# Patient Record
Sex: FEMALE | Race: WHITE | Employment: UNEMPLOYED | URBAN - METROPOLITAN AREA
[De-identification: names, ages, dates, MRNs, and addresses within clinical notes are randomized per-mention and may not be internally consistent; named-entity substitution may affect disease eponyms.]

---

## 2017-03-15 PROCEDURE — 88305 TISSUE EXAM BY PATHOLOGIST: CPT | Performed by: RADIOLOGY

## 2018-05-22 PROCEDURE — 88305 TISSUE EXAM BY PATHOLOGIST: CPT | Performed by: RADIOLOGY

## 2019-08-02 ENCOUNTER — HOSPITAL ENCOUNTER (EMERGENCY)
Age: 49
Discharge: HOME | End: 2019-08-02
Attending: EMERGENCY MEDICINE
Payer: COMMERCIAL

## 2019-08-02 VITALS
DIASTOLIC BLOOD PRESSURE: 79 MMHG | OXYGEN SATURATION: 100 % | HEIGHT: 59 IN | SYSTOLIC BLOOD PRESSURE: 137 MMHG | TEMPERATURE: 99 F | WEIGHT: 135 LBS | BODY MASS INDEX: 27.21 KG/M2 | HEART RATE: 71 BPM | RESPIRATION RATE: 16 BRPM

## 2019-08-02 DIAGNOSIS — S91.331A PUNCTURE WOUND OF RIGHT FOOT, INITIAL ENCOUNTER: Primary | ICD-10-CM

## 2019-08-02 PROCEDURE — 90471 IMMUNIZATION ADMIN: CPT

## 2019-08-02 PROCEDURE — 99283 EMERGENCY DEPT VISIT LOW MDM: CPT

## 2019-08-02 RX ORDER — CIPROFLOXACIN 500 MG/1
500 TABLET, FILM COATED ORAL 2 TIMES DAILY
Qty: 20 TABLET | Refills: 0 | Status: SHIPPED | OUTPATIENT
Start: 2019-08-02 | End: 2019-08-12

## 2019-08-02 NOTE — ED PROVIDER NOTES
Patient Seen in: [facility] In [place]    History   Patient presents with:  Laceration Abrasion (integumentary)    Stated Complaint: stepped on a nail    HPI  58-year-old woman stepped on a nail.   Her roof was recently done she is wea MDM        Patient will be referred to podiatry for close follow-up. Given Cipro here tetanus updated.   Patient informed that his possible that a tiny portion of her shoe may have penetrated into the plantar surface of the foot although appears unlike

## 2019-10-23 PROBLEM — S83.511D NEW ACL TEAR, RIGHT, SUBSEQUENT ENCOUNTER: Status: ACTIVE | Noted: 2019-10-23

## 2019-11-04 PROBLEM — S83.281D ACUTE LATERAL MENISCAL TEAR, RIGHT, SUBSEQUENT ENCOUNTER: Status: ACTIVE | Noted: 2019-10-23

## 2019-11-04 PROBLEM — M25.461 EFFUSION OF KNEE JOINT RIGHT: Status: ACTIVE | Noted: 2019-11-04

## 2019-12-03 ENCOUNTER — OFFICE VISIT (OUTPATIENT)
Dept: PHYSICAL THERAPY | Age: 49
End: 2019-12-03
Attending: INTERNAL MEDICINE
Payer: COMMERCIAL

## 2019-12-03 DIAGNOSIS — M17.11 PRIMARY OSTEOARTHRITIS OF RIGHT KNEE: ICD-10-CM

## 2019-12-03 DIAGNOSIS — S83.281D ACUTE LATERAL MENISCUS TEAR OF RIGHT KNEE, SUBSEQUENT ENCOUNTER: ICD-10-CM

## 2019-12-03 PROCEDURE — 97161 PT EVAL LOW COMPLEX 20 MIN: CPT

## 2019-12-03 PROCEDURE — 97110 THERAPEUTIC EXERCISES: CPT

## 2019-12-03 NOTE — PROGRESS NOTES
POST-OP KNEE EVALUATION:   Referring Physician: Dr. Sadaf Ortiz  Diagnosis:  R medial menisectomy 11/15/19   Date of Service: 12/3/2019     PATIENT SUMMARY   Brittany Kimble is a 47year old female who presents to therapy today s/p R knee menisectomy on 11/15 Therapy is medically necessary to address the above impairments and reach functional goals. Precautions:  None  OBJECTIVE:   Observation/Skin:  Normal. Pt has band- aids over the port holes today   Palpation:  Tender MCL region and with patellar mobs. structures involved/activity limitations, and evolving symptoms including changing pain levels.   PLAN OF CARE:    Goals: (To be met in 12 visits)   · Pt will improve knee extension ROM to 0 deg to allow proper heel strike during gait and terminal knee exte this plan of treatment and while under my care.     X___________________________________________________ Date____________________    Certification From: 00/2/7185  To:3/2/2020

## 2019-12-06 ENCOUNTER — OFFICE VISIT (OUTPATIENT)
Dept: PHYSICAL THERAPY | Age: 49
End: 2019-12-06
Attending: ORTHOPAEDIC SURGERY
Payer: COMMERCIAL

## 2019-12-06 PROCEDURE — 97110 THERAPEUTIC EXERCISES: CPT

## 2019-12-06 NOTE — PROGRESS NOTES
Dx:   R medial menisectomy 11/15/19        Insurance (Authorized # of Visits):  12 recommended            Authorizing Physician: Dr. Tineo ref.  provider found  Next MD visit: none scheduled  Fall Risk: standard         Precautions: n/a             Subjective: Total Timed Treatment: 40 min  Total Treatment Time: 50 min

## 2019-12-09 ENCOUNTER — APPOINTMENT (OUTPATIENT)
Dept: PHYSICAL THERAPY | Age: 49
End: 2019-12-09
Attending: ORTHOPAEDIC SURGERY
Payer: COMMERCIAL

## 2019-12-09 PROCEDURE — 97110 THERAPEUTIC EXERCISES: CPT

## 2019-12-09 NOTE — PROGRESS NOTES
Dx:   R medial menisectomy 11/15/19        Insurance (Authorized # of Visits):  12 recommended            Authorizing Physician: Dr. Tineo ref.  provider found  Next MD visit: none scheduled  Fall Risk: standard         Precautions: n/a             Subjective: min, 4 min full rev Nu step, seat 5, L3 5 min       Patellar mobs, 3 min  Ant tib mob Patellar mobs, 3 min  Ant tib mob, gr 3, 4x10 bouts       SB knee flexion/ext 2x15  Manual HS stretch 3x30 supine on right LE       PROM, OP extension is mult positions,

## 2019-12-11 ENCOUNTER — APPOINTMENT (OUTPATIENT)
Dept: PHYSICAL THERAPY | Age: 49
End: 2019-12-11
Payer: COMMERCIAL

## 2019-12-12 ENCOUNTER — OFFICE VISIT (OUTPATIENT)
Dept: PHYSICAL THERAPY | Age: 49
End: 2019-12-12
Attending: ORTHOPAEDIC SURGERY
Payer: COMMERCIAL

## 2019-12-12 PROCEDURE — 97110 THERAPEUTIC EXERCISES: CPT

## 2019-12-12 NOTE — PROGRESS NOTES
Dx:   R medial menisectomy 11/15/19        Insurance (Authorized # of Visits):  12 recommended            Authorizing Physician: Dr. Tineo ref.  provider found  Next MD visit: none scheduled  Fall Risk: standard         Precautions: n/a             Subjective: full rev Nu step, seat 5, L3 5 min  Bike L3 hills 5 min      Patellar mobs, 3 min  Ant tib mob Patellar mobs, 3 min  Ant tib mob, gr 3, 4x10 bouts  Standing gastroc stretch on AP board 3x30      SB knee flexion/ext 2x15  Manual HS stretch 3x30 supine on ri

## 2019-12-16 ENCOUNTER — APPOINTMENT (OUTPATIENT)
Dept: PHYSICAL THERAPY | Age: 49
End: 2019-12-16
Payer: COMMERCIAL

## 2019-12-18 ENCOUNTER — OFFICE VISIT (OUTPATIENT)
Dept: PHYSICAL THERAPY | Age: 49
End: 2019-12-18
Attending: INTERNAL MEDICINE
Payer: COMMERCIAL

## 2019-12-18 PROCEDURE — 97110 THERAPEUTIC EXERCISES: CPT

## 2019-12-18 NOTE — PROGRESS NOTES
Dx:   R medial menisectomy 11/15/19        Insurance (Authorized # of Visits):  12 recommended            Authorizing Physician: Dr. Roly Christianson  Next MD visit: none scheduled  Fall Risk: standard         Precautions: n/a             Subjective: she reports so 5, half rev 1 min, 4 min full rev Nu step, seat 5, L3 5 min  Bike L3 hills 5 min  Bike 5 mins L3     Patellar mobs, 3 min  Ant tib mob Patellar mobs, 3 min  Ant tib mob, gr 3, 4x10 bouts  Standing gastroc stretch on AP board 3x30  Gait training  Hip flexor

## 2019-12-20 ENCOUNTER — OFFICE VISIT (OUTPATIENT)
Dept: PHYSICAL THERAPY | Age: 49
End: 2019-12-20
Attending: INTERNAL MEDICINE
Payer: COMMERCIAL

## 2019-12-20 PROCEDURE — 97110 THERAPEUTIC EXERCISES: CPT

## 2019-12-20 NOTE — PROGRESS NOTES
Dx:   R medial menisectomy 11/15/19        Insurance (Authorized # of Visits):  12 recommended            Authorizing Physician: Dr. Brandon Hurley  Next MD visit: none scheduled  Fall Risk: standard         Precautions: n/a             Subjective:  Soreness late stretches  Red band hip ext 10 x 2 sets in bars  Hip flex 10 x red band   Lateral side walking 10 x   HS/piriformis/ITB stretch 3 x 30 secs  Prone knee hand 3 mins with OP   Gait training 5 mins   Cold compression mod compression  15 mins 34 degs      [place]

## 2019-12-23 ENCOUNTER — OFFICE VISIT (OUTPATIENT)
Dept: PHYSICAL THERAPY | Age: 49
End: 2019-12-23
Attending: ORTHOPAEDIC SURGERY
Payer: COMMERCIAL

## 2019-12-23 PROCEDURE — 97016 VASOPNEUMATIC DEVICE THERAPY: CPT

## 2019-12-23 PROCEDURE — 97110 THERAPEUTIC EXERCISES: CPT

## 2019-12-23 NOTE — PROGRESS NOTES
Dx:   R medial menisectomy 11/15/19        Insurance (Authorized # of Visits):  12 recommended            Authorizing Physician: Dr. Tineo ref.  provider found  Next MD visit: none scheduled  Fall Risk: standard         Precautions: n/a             Subjective: bouts of 30 sec stretches  Red band hip ext 10 x 2 sets in bars  Hip flex 10 x red band   Lateral side walking 10 x   HS/piriformis/ITB stretch 3 x 30 secs  Prone knee hand 3 mins with OP   Gait training 5 mins   Cold compression mod compression  15 mins 3

## 2019-12-26 ENCOUNTER — OFFICE VISIT (OUTPATIENT)
Dept: PHYSICAL THERAPY | Age: 49
End: 2019-12-26
Attending: INTERNAL MEDICINE
Payer: COMMERCIAL

## 2019-12-26 PROCEDURE — 97110 THERAPEUTIC EXERCISES: CPT

## 2019-12-27 NOTE — PROGRESS NOTES
Dx:   R medial menisectomy 11/15/19        Insurance (Authorized # of Visits):  12 recommended            Authorizing Physician: Dr. Tineo ref.  provider found  Next MD visit: none scheduled  Fall Risk: standard         Precautions: n/a             Subjective: TX#: 5/12 Date: 12/20/19  Tx#: 6/12 12/23/2019  tx 7  12/27/19  8    Bike L3 hills 5 min  Bike 5 mins L3  Bike  L3 5 mins   STM distal ITB 5 mins pt in side lying  PROM ext 3 bouts of 30 sec stretches  Red band hip ext 10 x 2 sets in bars  Hip flex 10 x

## 2020-01-02 ENCOUNTER — APPOINTMENT (OUTPATIENT)
Dept: PHYSICAL THERAPY | Age: 50
End: 2020-01-02
Payer: COMMERCIAL

## 2020-01-13 ENCOUNTER — APPOINTMENT (OUTPATIENT)
Dept: PHYSICAL THERAPY | Age: 50
End: 2020-01-13
Attending: INTERNAL MEDICINE
Payer: COMMERCIAL

## 2021-07-08 PROBLEM — Z91.89 AT HIGH RISK FOR BREAST CANCER: Status: ACTIVE | Noted: 2021-07-08

## 2023-12-23 ENCOUNTER — APPOINTMENT (OUTPATIENT)
Dept: GENERAL RADIOLOGY | Age: 53
End: 2023-12-23
Attending: NURSE PRACTITIONER
Payer: COMMERCIAL

## 2023-12-23 ENCOUNTER — HOSPITAL ENCOUNTER (OUTPATIENT)
Age: 53
Discharge: HOME | End: 2023-12-23
Payer: COMMERCIAL

## 2023-12-23 VITALS
WEIGHT: 142 LBS | HEIGHT: 59 IN | RESPIRATION RATE: 18 BRPM | SYSTOLIC BLOOD PRESSURE: 148 MMHG | TEMPERATURE: 98 F | BODY MASS INDEX: 28.63 KG/M2 | HEART RATE: 67 BPM | OXYGEN SATURATION: 98 % | DIASTOLIC BLOOD PRESSURE: 71 MMHG

## 2023-12-23 DIAGNOSIS — U07.1 COVID-19: Primary | ICD-10-CM

## 2023-12-23 LAB — SARS-COV-2 RNA RESP QL NAA+PROBE: DETECTED

## 2023-12-23 PROCEDURE — 99204 OFFICE O/P NEW MOD 45 MIN: CPT

## 2023-12-23 PROCEDURE — 71046 X-RAY EXAM CHEST 2 VIEWS: CPT | Performed by: NURSE PRACTITIONER

## 2023-12-23 PROCEDURE — 99203 OFFICE O/P NEW LOW 30 MIN: CPT

## 2023-12-23 NOTE — ED INITIAL ASSESSMENT (HPI)
C/o dry cough for 3 weeks. Pt denies any other symptoms or fever. Pt family has Covid. Pt reports treated twice with 2 antibiotics with no relief.

## (undated) NOTE — ED AVS SNAPSHOT
Ashudestiny Canales   MRN: [id number]    Department:  [facility] in [place]   Date of Visit:  8/2/2019           Disclosure     Insurance plans vary and the physician(s) referred by the ER may not be covered by your plan.  Please contact tell this physician (or your personal doctor if your instructions are to return to your personal doctor) about any new or lasting problems. The primary care or specialist physician will see patients referred from the [facility] Emergency Department.  David Ballard